# Patient Record
Sex: MALE | Race: WHITE | NOT HISPANIC OR LATINO | Employment: OTHER | URBAN - METROPOLITAN AREA
[De-identification: names, ages, dates, MRNs, and addresses within clinical notes are randomized per-mention and may not be internally consistent; named-entity substitution may affect disease eponyms.]

---

## 2017-02-01 ENCOUNTER — TRANSCRIBE ORDERS (OUTPATIENT)
Dept: LAB | Facility: HOSPITAL | Age: 62
End: 2017-02-01

## 2017-02-01 DIAGNOSIS — E11.8 DIABETIC COMPLICATION (HCC): Primary | ICD-10-CM

## 2017-02-06 ENCOUNTER — OFFICE VISIT (OUTPATIENT)
Dept: FAMILY MEDICINE CLINIC | Facility: CLINIC | Age: 62
End: 2017-02-06

## 2017-02-06 VITALS
HEART RATE: 70 BPM | HEIGHT: 71 IN | DIASTOLIC BLOOD PRESSURE: 70 MMHG | SYSTOLIC BLOOD PRESSURE: 108 MMHG | BODY MASS INDEX: 23.24 KG/M2 | WEIGHT: 166 LBS | OXYGEN SATURATION: 96 %

## 2017-02-06 DIAGNOSIS — R25.1 TREMOR: ICD-10-CM

## 2017-02-06 DIAGNOSIS — I10 ESSENTIAL HYPERTENSION: Primary | ICD-10-CM

## 2017-02-06 DIAGNOSIS — E78.5 DYSLIPIDEMIA: ICD-10-CM

## 2017-02-06 PROCEDURE — 99213 OFFICE O/P EST LOW 20 MIN: CPT | Performed by: NURSE PRACTITIONER

## 2017-02-06 RX ORDER — ASPIRIN 81 MG/1
81 TABLET ORAL DAILY
COMMUNITY

## 2017-02-06 RX ORDER — ATORVASTATIN CALCIUM 40 MG/1
40 TABLET, FILM COATED ORAL DAILY
Qty: 30 TABLET | Refills: 2 | Status: SHIPPED | OUTPATIENT
Start: 2017-02-06

## 2017-02-06 RX ORDER — PRIMIDONE 50 MG/1
50 TABLET ORAL 2 TIMES DAILY
Qty: 60 TABLET | Refills: 2 | Status: SHIPPED | OUTPATIENT
Start: 2017-02-06 | End: 2017-05-26 | Stop reason: SDUPTHER

## 2017-02-06 RX ORDER — PROPRANOLOL HYDROCHLORIDE 40 MG/1
40 TABLET ORAL 2 TIMES DAILY
Qty: 60 TABLET | Refills: 2 | Status: SHIPPED | OUTPATIENT
Start: 2017-02-06 | End: 2017-05-26 | Stop reason: SDUPTHER

## 2017-02-06 RX ORDER — RAMIPRIL 5 MG/1
5 CAPSULE ORAL DAILY
Qty: 30 CAPSULE | Refills: 2 | Status: SHIPPED | OUTPATIENT
Start: 2017-02-06 | End: 2017-04-25 | Stop reason: SDUPTHER

## 2017-02-06 NOTE — PROGRESS NOTES
"Subjective   Tanvir Sanchez is a 61 y.o. male.     History of Present Illness     6 month f/u  Mr Sanchez is here for 6 mo f/u.  He is taking altace and propranolol for HTN, home BP readings are \"good\".  He is also needing refill on primidone for his tremor.  This works very well for him  He has seen cardiology for annual f/u 8/2016, he denies any anginal symptoms  He is also currently under the care of Dr. Rodriguez for his diabetes.  He has no complaints today    The following portions of the patient's history were reviewed and updated as appropriate: allergies, current medications, past family history, past medical history, past social history, past surgical history and problem list.    Review of Systems   Constitutional: Negative.    HENT: Negative.    Respiratory: Negative for shortness of breath and stridor. Cough: chronic cough, +smoker, did not have xray done at last visit.    Cardiovascular: Negative.    Gastrointestinal: Negative.    Endocrine: Negative.    Neurological: Negative.        Objective   Physical Exam   Constitutional: He is oriented to person, place, and time. He appears well-developed and well-nourished. No distress.   HENT:   Head: Normocephalic and atraumatic.   Eyes: Conjunctivae are normal.   Neck: Neck supple.   Cardiovascular: Normal rate, regular rhythm and normal heart sounds.    Pulmonary/Chest: Effort normal and breath sounds normal. No respiratory distress. He has no wheezes. He has no rales.   Neurological: He is alert and oriented to person, place, and time.   Skin: Skin is warm.   Vitals reviewed.      Assessment/Plan   Tanvir was seen today for follow-up.    Diagnoses and all orders for this visit:    Essential hypertension  -     ramipril (ALTACE) 5 MG capsule; Take 1 capsule by mouth Daily.    Dyslipidemia  -     atorvastatin (LIPITOR) 40 MG tablet; Take 1 tablet by mouth Daily.    Tremor  -     propranolol (INDERAL) 40 MG tablet; Take 1 tablet by mouth 2 (Two) Times a Day.  -    "  primidone (MYSOLINE) 50 MG tablet; Take 1 tablet by mouth 2 (Two) Times a Day.    labs done 2/1/17 by Michael, we have called for results.  I have given him refills today.  Unfortunately he now has an insurance we do not take, he will transfer care and has book of PCPs.  Continue follow up with Michael as scheduled

## 2017-04-25 DIAGNOSIS — I10 ESSENTIAL HYPERTENSION: ICD-10-CM

## 2017-04-26 RX ORDER — RAMIPRIL 5 MG/1
CAPSULE ORAL
Qty: 30 CAPSULE | Refills: 3 | Status: SHIPPED | OUTPATIENT
Start: 2017-04-26

## 2017-05-26 DIAGNOSIS — R25.1 TREMOR: ICD-10-CM

## 2017-05-30 RX ORDER — PRIMIDONE 50 MG/1
TABLET ORAL
Qty: 60 TABLET | Refills: 1 | Status: SHIPPED | OUTPATIENT
Start: 2017-05-30

## 2017-05-30 RX ORDER — PROPRANOLOL HYDROCHLORIDE 40 MG/1
TABLET ORAL
Qty: 60 TABLET | Refills: 1 | Status: SHIPPED | OUTPATIENT
Start: 2017-05-30

## 2017-07-27 DIAGNOSIS — R25.1 TREMOR: ICD-10-CM

## 2017-07-28 RX ORDER — PRIMIDONE 50 MG/1
TABLET ORAL
Qty: 60 TABLET | Refills: 0 | OUTPATIENT
Start: 2017-07-28

## 2017-07-31 DIAGNOSIS — R25.1 TREMOR: ICD-10-CM

## 2017-07-31 RX ORDER — PRIMIDONE 50 MG/1
TABLET ORAL
Qty: 60 TABLET | Refills: 0 | OUTPATIENT
Start: 2017-07-31

## 2017-08-03 ENCOUNTER — OFFICE VISIT (OUTPATIENT)
Dept: RETAIL CLINIC | Facility: CLINIC | Age: 62
End: 2017-08-03

## 2017-08-03 DIAGNOSIS — Z02.83 ENCOUNTER FOR DRUG SCREENING: Primary | ICD-10-CM

## 2017-08-12 DIAGNOSIS — R25.1 TREMOR: ICD-10-CM

## 2017-08-23 RX ORDER — PRIMIDONE 50 MG/1
TABLET ORAL
Qty: 60 TABLET | Refills: 0 | OUTPATIENT
Start: 2017-08-23

## 2017-08-23 NOTE — TELEPHONE ENCOUNTER
Fadumo is no longer here and we do not take his ins so he will need to get this med from another provider.

## 2017-09-06 DIAGNOSIS — I10 ESSENTIAL HYPERTENSION: ICD-10-CM

## 2017-09-06 DIAGNOSIS — E78.5 DYSLIPIDEMIA: ICD-10-CM

## 2017-09-06 DIAGNOSIS — R25.1 TREMOR: ICD-10-CM

## 2017-09-06 RX ORDER — ATORVASTATIN CALCIUM 40 MG/1
TABLET, FILM COATED ORAL
Qty: 90 TABLET | Refills: 1 | OUTPATIENT
Start: 2017-09-06

## 2017-09-06 RX ORDER — PRIMIDONE 50 MG/1
TABLET ORAL
Qty: 60 TABLET | Refills: 0 | OUTPATIENT
Start: 2017-09-06

## 2017-09-06 RX ORDER — RAMIPRIL 5 MG/1
CAPSULE ORAL
Qty: 30 CAPSULE | Refills: 2 | OUTPATIENT
Start: 2017-09-06

## 2018-06-04 DIAGNOSIS — M25.512 LEFT SHOULDER PAIN, UNSPECIFIED CHRONICITY: Primary | ICD-10-CM

## 2018-06-05 ENCOUNTER — OFFICE VISIT (OUTPATIENT)
Dept: ORTHOPEDIC SURGERY | Facility: CLINIC | Age: 63
End: 2018-06-05

## 2018-06-05 VITALS — BODY MASS INDEX: 23.24 KG/M2 | HEIGHT: 71 IN | WEIGHT: 166 LBS | RESPIRATION RATE: 18 BRPM

## 2018-06-05 DIAGNOSIS — IMO0002 BURSITIS/TENDONITIS, SHOULDER: ICD-10-CM

## 2018-06-05 DIAGNOSIS — M25.512 LEFT SHOULDER PAIN, UNSPECIFIED CHRONICITY: Primary | ICD-10-CM

## 2018-06-05 PROCEDURE — 99204 OFFICE O/P NEW MOD 45 MIN: CPT | Performed by: ORTHOPAEDIC SURGERY

## 2018-06-05 NOTE — PROGRESS NOTES
Subjective   Patient ID: Tanvir Sanchez is a 62 y.o. male  Pain of the Left Upper Arm (Patient states about 3 months ago he was doing a work out regimen with a bowflex machine and later that night after he was finished working out his arm started hurting in the tricep area and hasn't stopped. He states it radiates down his arm.)             History of Present Illness    3 months ago was using a Bowflex machine after doing the machine he felt some pain and stiffness in the left upper arm hurts right behind the mid humeral area no paresthesias it really doesn't radiate to his neck at times it feels like it does radiate to the back of his left shoulder.  He's noticed some progressive loss of motion in the shoulder but has no pain in the shoulder itself he says.  No history of prior shoulder injuries dislocations used to do a lot of weightlifting years ago is right-hand-dominant.    Review of Systems   Constitutional: Negative for fever.   HENT: Negative for voice change.    Eyes: Negative for visual disturbance.   Respiratory: Negative for shortness of breath.    Cardiovascular: Negative for chest pain.   Gastrointestinal: Negative for abdominal distention and abdominal pain.   Genitourinary: Negative for dysuria.   Musculoskeletal: Positive for arthralgias. Negative for gait problem and joint swelling.   Skin: Negative for rash.   Neurological: Negative for speech difficulty.   Hematological: Does not bruise/bleed easily.   Psychiatric/Behavioral: Negative for confusion.       Past Medical History:   Diagnosis Date   • CAD (coronary artery disease)    • Diabetes mellitus     Type 2 diabetes, poorly controlled.   • Essential hypertension    • Essential tremor    • Hyperlipemia    • Myocardial infarction     Status post myocardial infarction with stenting x2 in [place] in 2009, incomplete database; Dr. Vi Justin.    • Tobacco use         Past Surgical History:   Procedure Laterality Date   • CORONARY ANGIOPLASTY WITH  STENT PLACEMENT      Status post myocardial infarction with stenting x2 in [place] in 2009, incomplete database; Dr. Vi Justin.       Family History   Problem Relation Age of Onset   • Coronary artery disease Father    • Diabetes Father        Social History     Social History   • Marital status:      Spouse name: N/A   • Number of children: N/A   • Years of education: N/A     Occupational History   •       Social History Main Topics   • Smoking status: Current Every Day Smoker     Packs/day: 0.50     Types: Cigarettes   • Smokeless tobacco: Never Used   • Alcohol use No   • Drug use: No   • Sexual activity: Defer     Other Topics Concern   • Not on file     Social History Narrative   • No narrative on file       I have reviewed all of the above social hx, family hx, surgical hx, medications, allergies & ROS and confirm that it is accurate.    Allergies   Allergen Reactions   • Nicoderm Cq [Nicotine]      heartburn         Current Outpatient Prescriptions:   •  Insulin Pen Needle (B-D ULTRAFINE III SHORT PEN) 31G X 8 MM misc, , Disp: , Rfl:   •  aspirin 81 MG EC tablet, Take 81 mg by mouth Daily., Disp: , Rfl:   •  atorvastatin (LIPITOR) 40 MG tablet, Take 1 tablet by mouth Daily., Disp: 30 tablet, Rfl: 2  •  Insulin Glargine (TOUJEO SOLOSTAR) 300 UNIT/ML solution pen-injector, Inject 40 Units under the skin daily., Disp: , Rfl:   •  metFORMIN (GLUCOPHAGE) 1000 MG tablet, Take 1,000 mg by mouth 2 (two) times a day with meals., Disp: , Rfl:   •  nitroglycerin (NITROSTAT) 0.4 MG SL tablet, Place 0.4 mg under the tongue every 5 (five) minutes as needed for chest pain. Take no more than 3 doses in 15 minutes., Disp: , Rfl:   •  primidone (MYSOLINE) 50 MG tablet, TAKE 1 TABLET BY MOUTH TWO TIMES A DAY , Disp: 60 tablet, Rfl: 1  •  propranolol (INDERAL) 40 MG tablet, TAKE 1 TABLET BY MOUTH TWO TIMES A DAY , Disp: 60 tablet, Rfl: 1  •  ramipril (ALTACE) 5 MG capsule, TAKE 1 CAPSULE BY MOUTH ONE TIME A DAY  ", Disp: 30 capsule, Rfl: 3    Objective   Resp 18   Ht 180.3 cm (71\")   Wt 75.3 kg (166 lb)   BMI 23.15 kg/m²    Physical Exam  Constitutional: Patient is oriented to person, place, and time. Patient appears well-developed and well-nourished.   HENT:Head: Normocephalic and atraumatic.   Eyes: EOM are normal. Pupils are equal, round, and reactive to light.   Neck: Normal range of motion. Neck supple.   Cardiovascular: Normal rate.    Pulmonary/Chest: Effort normal and breath sounds normal.   Abdominal: Soft.   Neurological: Patient is alert and oriented to person, place, and time.   Skin: Skin is warm and dry.   Psychiatric: Patient has a normal mood and affect.   Nursing note and vitals reviewed.       Ortho Exam   Left shoulder with some tenderness posterior lateral acromial area, no significant supraspinous atrophy, abduction limited to 120 forward flexion limited to 1:30 impingement sign negative for anterior shoulder pain no tenderness, clavicle joint or proximal biceps tendon, some tenderness at the mid substance trapezial area but no palpable masses erythema swelling or focal fluctuance.    Assessment/Plan   Review of Radiographic Studies:    Radiographic images today of affected area I personally viewed and showed no sign of acute fracture or dislocation.      Procedures     Tanvir was seen today for pain.    Diagnoses and all orders for this visit:    Left shoulder pain, unspecified chronicity  -     XR Humerus Left    Bursitis/tendonitis, shoulder       Orthopedic activities reviewed and patient expressed appreciation and Risk, benefits, and merits of treatment alternatives reviewed with the patient and questions answered      Recommendations/Plan:   Exercise, medications, injections, other patient advice, and return appointment as noted.    Patient agreeable to call or return sooner for any concerns.             Impression:  Mild case of adhesive capsulitis left shoulder with referred pain to the left " proximal upper arm possible rotator cuff impingement, unable to take anti-inflammatory medications upon the recommendation of his cardiologist with a history of MI and  2 prior stents  Plan:  Therapy referral recheck 6 weeks if pain not improving we'll order MRI at that point

## 2020-07-06 ENCOUNTER — CONSULT (OUTPATIENT)
Dept: CARDIOLOGY | Facility: CLINIC | Age: 65
End: 2020-07-06

## 2020-07-06 VITALS
DIASTOLIC BLOOD PRESSURE: 60 MMHG | OXYGEN SATURATION: 99 % | WEIGHT: 158 LBS | SYSTOLIC BLOOD PRESSURE: 110 MMHG | HEIGHT: 71 IN | TEMPERATURE: 98.4 F | HEART RATE: 50 BPM | BODY MASS INDEX: 22.12 KG/M2

## 2020-07-06 DIAGNOSIS — E78.5 DYSLIPIDEMIA: ICD-10-CM

## 2020-07-06 DIAGNOSIS — I25.10 CORONARY ARTERY DISEASE INVOLVING NATIVE CORONARY ARTERY OF NATIVE HEART WITHOUT ANGINA PECTORIS: Primary | ICD-10-CM

## 2020-07-06 DIAGNOSIS — I10 ESSENTIAL HYPERTENSION: ICD-10-CM

## 2020-07-06 DIAGNOSIS — I25.119 CORONARY ARTERY DISEASE INVOLVING NATIVE CORONARY ARTERY OF NATIVE HEART WITH ANGINA PECTORIS (HCC): Primary | ICD-10-CM

## 2020-07-06 DIAGNOSIS — R06.09 DOE (DYSPNEA ON EXERTION): ICD-10-CM

## 2020-07-06 PROCEDURE — 99243 OFF/OP CNSLTJ NEW/EST LOW 30: CPT | Performed by: INTERNAL MEDICINE

## 2020-07-06 PROCEDURE — 93000 ELECTROCARDIOGRAM COMPLETE: CPT | Performed by: INTERNAL MEDICINE

## 2020-07-06 RX ORDER — LEVOTHYROXINE SODIUM 0.05 MG/1
50 TABLET ORAL DAILY
COMMUNITY

## 2020-07-06 NOTE — PROGRESS NOTES
Subjective:     Encounter Date:07/06/2020    Primary Care Physician: Yong Lam MD      Patient ID: Tanvir Sanchez is a 65 y.o. male.    Chief Complaint:Coronary artery disease involving native coronary artery of     PROBLEM LIST:  1. Coronary artery  disease:  a. Status post myocardial infarction with stenting x2  in [place] in 2009, incomplete database; Dr. Vi Justin.   2. Hypertension.   3.  Dyslipidemia.   4. Type 2 diabetes, poorly controlled.   5. Ongoing tobacco abuse.   6. Hypothyroidism   7. Essential tremor.         Allergies   Allergen Reactions   • Nicoderm Cq [Nicotine]      heartburn         Current Outpatient Medications:   •  aspirin 81 MG EC tablet, Take 81 mg by mouth Daily., Disp: , Rfl:   •  atorvastatin (LIPITOR) 40 MG tablet, Take 1 tablet by mouth Daily., Disp: 30 tablet, Rfl: 2  •  Insulin Glargine (TOUJEO SOLOSTAR) 300 UNIT/ML solution pen-injector, Inject 55 Units under the skin into the appropriate area as directed Daily., Disp: , Rfl:   •  Insulin Pen Needle (B-D ULTRAFINE III SHORT PEN) 31G X 8 MM misc, , Disp: , Rfl:   •  levothyroxine (SYNTHROID, LEVOTHROID) 50 MCG tablet, Take 50 mcg by mouth Daily., Disp: , Rfl:   •  metFORMIN (GLUCOPHAGE) 1000 MG tablet, Take 500 mg by mouth 2 (Two) Times a Day With Meals., Disp: , Rfl:   •  nitroglycerin (NITROSTAT) 0.4 MG SL tablet, Place 0.4 mg under the tongue every 5 (five) minutes as needed for chest pain. Take no more than 3 doses in 15 minutes., Disp: , Rfl:   •  primidone (MYSOLINE) 50 MG tablet, TAKE 1 TABLET BY MOUTH TWO TIMES A DAY  (Patient taking differently: Take 50 mg by mouth 3 (Three) Times a Day.), Disp: 60 tablet, Rfl: 1  •  propranolol (INDERAL) 40 MG tablet, TAKE 1 TABLET BY MOUTH TWO TIMES A DAY , Disp: 60 tablet, Rfl: 1  •  ramipril (ALTACE) 5 MG capsule, TAKE 1 CAPSULE BY MOUTH ONE TIME A DAY , Disp: 30 capsule, Rfl: 3        History of Present Illness    Patient is a 65-year-old  male who we are seeing  today for reestablishment of cardiac care for previous history of coronary artery disease.  It is been last seen about 4 years ago.  Patient notes over that the last few months especially since the summer began feeling exhausted after working for roughly 4 hours.  Some occasional chest discomfort.  Denies any significant increase of shortness of breath.  No syncope, near syncope, or orthopnea.  Has some occasional lower extremity edema which is typically improved with elevation of his extremities.  As it is been sometime since he had been evaluated for coronary disease he referred himself back for further evaluation especially given his now new exertional symptoms.    The following portions of the patient's history were reviewed and updated as appropriate: allergies, current medications, past family history, past medical history, past social history, past surgical history and problem list.    Family History   Problem Relation Age of Onset   • Coronary artery disease Father    • Diabetes Father        Social History     Tobacco Use   • Smoking status: Current Every Day Smoker     Packs/day: 0.50     Types: Cigarettes   • Smokeless tobacco: Never Used   Substance Use Topics   • Alcohol use: No   • Drug use: No         Review of Systems   Constitution: Positive for malaise/fatigue.   HENT: Positive for tinnitus.    Cardiovascular: Negative for chest pain, dyspnea on exertion, leg swelling, palpitations and syncope.   Respiratory: Negative.  Negative for shortness of breath.    Endocrine: Positive for cold intolerance.   Hematologic/Lymphatic: Negative for bleeding problem. Bruises/bleeds easily.   Skin: Negative for rash.   Musculoskeletal: Negative for muscle weakness and myalgias.   Gastrointestinal: Negative for heartburn, nausea and vomiting.   Neurological: Positive for excessive daytime sleepiness and tremors. Negative for dizziness, light-headedness, loss of balance and numbness.   Allergic/Immunologic: Positive  "for environmental allergies.          Objective:   /60 (BP Location: Right arm, Patient Position: Sitting)   Pulse 50   Temp 98.4 °F (36.9 °C)   Ht 180.3 cm (71\")   Wt 71.7 kg (158 lb)   SpO2 99%   BMI 22.04 kg/m²         Physical Exam   Constitutional: He is oriented to person, place, and time. He appears well-developed and well-nourished.   HENT:   Head: Normocephalic and atraumatic.   Mouth/Throat: Oropharynx is clear and moist.   Eyes: Pupils are equal, round, and reactive to light. Conjunctivae are normal.   Neck: Normal carotid pulses and no JVD present. Carotid bruit is not present. No thyromegaly present.   Cardiovascular: Normal rate, regular rhythm, S1 normal and S2 normal. Exam reveals gallop. Exam reveals no friction rub.   No murmur heard.  Pulses:       Carotid pulses are 2+ on the right side, and 2+ on the left side.       Dorsalis pedis pulses are 2+ on the right side, and 2+ on the left side.        Posterior tibial pulses are 2+ on the right side, and 2+ on the left side.   Pulmonary/Chest: No respiratory distress. He has no wheezes. He has no rales. He exhibits no tenderness.   Abdominal: He exhibits no distension, no abdominal bruit and no mass. There is no hepatosplenomegaly. There is no tenderness. There is no rebound.   Musculoskeletal: He exhibits no edema, tenderness or deformity.   Lymphadenopathy:     He has no cervical adenopathy.   Neurological: He is alert and oriented to person, place, and time. He has normal strength.   Skin: Skin is warm and dry. No rash noted. No cyanosis. Nails show no clubbing.   Psychiatric: He has a normal mood and affect. Cognition and memory are normal.         ECG 12 Lead  Date/Time: 7/6/2020 3:41 PM  Performed by: Jose Alberto Cates MD  Authorized by: Jose Alberto Cates MD   Comparison: not compared with previous ECG   Previous ECG: no previous ECG available  Rhythm: sinus bradycardia    Clinical impression: normal ECG                  Assessment: "   Assessment/Plan      Tanvir was seen today for coronary artery disease involving native coronary artery of .    Diagnoses and all orders for this visit:    Coronary artery disease involving native coronary artery of native heart with angina pectoris (CMS/[facility])  -     ECG 12 Lead    FLETCHER (dyspnea on exertion)    Dyslipidemia    Essential hypertension      1.  Known coronary artery disease.  New onset functional class II-3 dyspnea because possible anginal equivalent.  2.  Dyslipidemia well-controlled  3.  Hypertension well-controlled    Recommendations  1.  Exercise myocardial perfusion study and echocardiogram to evaluate symptoms.  2.  Continue current medical therapy  3.  Further recommendations after the above    I have seen and examined the patient, I have reviewed the note, discussed the case with the advance practice clinician, made necessary changes and I agree with the final note.    Jose Alberto Cates MD  07/06/20  16:19          Rachel ACUNA scribed portions of this dictation for Dr. Jose Alberto Cates.    Dictated utilizing Dragon dictation

## 2020-09-01 ENCOUNTER — HOSPITAL ENCOUNTER (OUTPATIENT)
Dept: CARDIOLOGY | Facility: HOSPITAL | Age: 65
Discharge: HOME | End: 2020-09-01
Admitting: INTERNAL MEDICINE

## 2020-09-01 VITALS — WEIGHT: 158 LBS | HEIGHT: 71 IN | BODY MASS INDEX: 22.12 KG/M2

## 2020-09-01 DIAGNOSIS — I25.10 CORONARY ARTERY DISEASE INVOLVING NATIVE CORONARY ARTERY OF NATIVE HEART WITHOUT ANGINA PECTORIS: ICD-10-CM

## 2020-09-01 PROCEDURE — 93017 CV STRESS TEST TRACING ONLY: CPT

## 2020-09-01 PROCEDURE — 93018 CV STRESS TEST I&R ONLY: CPT | Performed by: INTERNAL MEDICINE

## 2020-09-01 PROCEDURE — 0 TECHNETIUM SESTAMIBI: Performed by: INTERNAL MEDICINE

## 2020-09-01 PROCEDURE — 78452 HT MUSCLE IMAGE SPECT MULT: CPT

## 2020-09-01 PROCEDURE — 93306 TTE W/DOPPLER COMPLETE: CPT

## 2020-09-01 PROCEDURE — 93306 TTE W/DOPPLER COMPLETE: CPT | Performed by: INTERNAL MEDICINE

## 2020-09-01 PROCEDURE — 78452 HT MUSCLE IMAGE SPECT MULT: CPT | Performed by: INTERNAL MEDICINE

## 2020-09-01 PROCEDURE — A9500 TC99M SESTAMIBI: HCPCS | Performed by: INTERNAL MEDICINE

## 2020-09-01 RX ADMIN — TECHNETIUM TC 99M SESTAMIBI 1 DOSE: 1 INJECTION INTRAVENOUS at 11:02

## 2020-09-01 RX ADMIN — TECHNETIUM TC 99M SESTAMIBI 1 DOSE: 1 INJECTION INTRAVENOUS at 13:35

## 2020-09-03 LAB
BH CV ECHO MEAS - AO ROOT AREA (BSA CORRECTED): 2.1
BH CV ECHO MEAS - AO ROOT AREA: 13 CM^2
BH CV ECHO MEAS - AO ROOT DIAM: 4.1 CM
BH CV ECHO MEAS - BSA(HAYCOCK): 1.9 M^2
BH CV ECHO MEAS - BSA: 1.9 M^2
BH CV ECHO MEAS - BZI_BMI: 22 KILOGRAMS/M^2
BH CV ECHO MEAS - BZI_METRIC_HEIGHT: 180.3 CM
BH CV ECHO MEAS - BZI_METRIC_WEIGHT: 71.7 KG
BH CV ECHO MEAS - EDV(CUBED): 73 ML
BH CV ECHO MEAS - EDV(MOD-SP2): 107 ML
BH CV ECHO MEAS - EDV(MOD-SP4): 111 ML
BH CV ECHO MEAS - EDV(TEICH): 77.6 ML
BH CV ECHO MEAS - EF(CUBED): 74.3 %
BH CV ECHO MEAS - EF(MOD-BP): 67 %
BH CV ECHO MEAS - EF(MOD-SP2): 61.7 %
BH CV ECHO MEAS - EF(MOD-SP4): 72.1 %
BH CV ECHO MEAS - EF(TEICH): 66.5 %
BH CV ECHO MEAS - ESV(CUBED): 18.8 ML
BH CV ECHO MEAS - ESV(MOD-SP2): 41 ML
BH CV ECHO MEAS - ESV(MOD-SP4): 31 ML
BH CV ECHO MEAS - ESV(TEICH): 26 ML
BH CV ECHO MEAS - FS: 36.4 %
BH CV ECHO MEAS - IVS/LVPW: 0.85
BH CV ECHO MEAS - IVSD: 0.89 CM
BH CV ECHO MEAS - LAD MAJOR: 4.6 CM
BH CV ECHO MEAS - LAT PEAK E' VEL: 12.6 CM/SEC
BH CV ECHO MEAS - LATERAL E/E' RATIO: 9.1
BH CV ECHO MEAS - LV DIASTOLIC VOL/BSA (35-75): 58.2 ML/M^2
BH CV ECHO MEAS - LV IVRT: 0.08 SEC
BH CV ECHO MEAS - LV MASS(C)D: 131.2 GRAMS
BH CV ECHO MEAS - LV MASS(C)DI: 68.8 GRAMS/M^2
BH CV ECHO MEAS - LV SYSTOLIC VOL/BSA (12-30): 16.2 ML/M^2
BH CV ECHO MEAS - LVIDD: 4.2 CM
BH CV ECHO MEAS - LVIDS: 2.7 CM
BH CV ECHO MEAS - LVLD AP2: 8.9 CM
BH CV ECHO MEAS - LVLD AP4: 8.5 CM
BH CV ECHO MEAS - LVLS AP2: 7 CM
BH CV ECHO MEAS - LVLS AP4: 7 CM
BH CV ECHO MEAS - LVOT AREA (M): 3.8 CM^2
BH CV ECHO MEAS - LVOT AREA: 3.9 CM^2
BH CV ECHO MEAS - LVOT DIAM: 2.2 CM
BH CV ECHO MEAS - LVPWD: 1.1 CM
BH CV ECHO MEAS - MED PEAK E' VEL: 10.1 CM/SEC
BH CV ECHO MEAS - MEDIAL E/E' RATIO: 11.4
BH CV ECHO MEAS - MV A MAX VEL: 93.8 CM/SEC
BH CV ECHO MEAS - MV DEC TIME: 0.21 SEC
BH CV ECHO MEAS - MV E MAX VEL: 116.8 CM/SEC
BH CV ECHO MEAS - MV E/A: 1.2
BH CV ECHO MEAS - PULM A REVS VEL: 33.1 CM/SEC
BH CV ECHO MEAS - PULM DIAS VEL: 46.9 CM/SEC
BH CV ECHO MEAS - PULM S/D: 1.2
BH CV ECHO MEAS - PULM SYS VEL: 55.3 CM/SEC
BH CV ECHO MEAS - RAP SYSTOLE: 8 MMHG
BH CV ECHO MEAS - SI(CUBED): 28.4 ML/M^2
BH CV ECHO MEAS - SI(MOD-SP2): 34.6 ML/M^2
BH CV ECHO MEAS - SI(MOD-SP4): 41.9 ML/M^2
BH CV ECHO MEAS - SI(TEICH): 27.1 ML/M^2
BH CV ECHO MEAS - SV(CUBED): 54.2 ML
BH CV ECHO MEAS - SV(MOD-SP2): 66 ML
BH CV ECHO MEAS - SV(MOD-SP4): 80 ML
BH CV ECHO MEAS - SV(TEICH): 51.7 ML
BH CV ECHO MEAS - TAPSE (>1.6): 3.1 CM2
BH CV ECHO MEASUREMENTS AVERAGE E/E' RATIO: 10.29
BH CV STRESS BP STAGE 1: NORMAL
BH CV STRESS BP STAGE 2: NORMAL
BH CV STRESS DURATION MIN STAGE 1: 3
BH CV STRESS DURATION MIN STAGE 2: 3
BH CV STRESS DURATION MIN STAGE 3: 2
BH CV STRESS DURATION SEC STAGE 1: 0
BH CV STRESS DURATION SEC STAGE 2: 0
BH CV STRESS DURATION SEC STAGE 3: 12
BH CV STRESS GRADE STAGE 1: 10
BH CV STRESS GRADE STAGE 2: 12
BH CV STRESS GRADE STAGE 3: 14
BH CV STRESS HR STAGE 1: 86
BH CV STRESS HR STAGE 2: 112
BH CV STRESS HR STAGE 3: 133
BH CV STRESS METS STAGE 1: 5
BH CV STRESS METS STAGE 2: 7.5
BH CV STRESS METS STAGE 3: 10
BH CV STRESS PROTOCOL 1: NORMAL
BH CV STRESS RECOVERY BP: NORMAL MMHG
BH CV STRESS RECOVERY HR: 85 BPM
BH CV STRESS RECOVERY O2: 97 %
BH CV STRESS SPEED STAGE 1: 1.7
BH CV STRESS SPEED STAGE 2: 2.5
BH CV STRESS SPEED STAGE 3: 3.4
BH CV STRESS STAGE 1: 1
BH CV STRESS STAGE 2: 2
BH CV STRESS STAGE 3: 3
BH CV VAS BP LEFT ARM: NORMAL MMHG
BH CV XLRA - RV BASE: 4.6 CM
BH CV XLRA - RV LENGTH: 8.2 CM
BH CV XLRA - RV MID: 3.6 CM
BH CV XLRA - TDI S': 15.7 CM/SEC
LEFT ATRIUM VOLUME INDEX: 26.7 ML/M^2
LEFT ATRIUM VOLUME: 51 ML
LV EF 2D ECHO EST: 65 %
LV EF NUC BP: 68 %
MAXIMAL PREDICTED HEART RATE: 157 BPM
PERCENT MAX PREDICTED HR: 85.35 %
STRESS BASELINE BP: NORMAL MMHG
STRESS BASELINE HR: 56 BPM
STRESS O2 SAT REST: 99 %
STRESS PERCENT HR: 100 %
STRESS POST ESTIMATED WORKLOAD: 8.8 METS
STRESS POST EXERCISE DUR MIN: 8 MIN
STRESS POST EXERCISE DUR SEC: 12 SEC
STRESS POST PEAK BP: NORMAL MMHG
STRESS POST PEAK HR: 134 BPM
STRESS TARGET HR: 133 BPM

## 2020-09-09 ENCOUNTER — TELEPHONE (OUTPATIENT)
Dept: CARDIOLOGY | Facility: CLINIC | Age: 65
End: 2020-09-09

## 2020-09-09 NOTE — TELEPHONE ENCOUNTER
Left VM advising of below.  Normal echo----- Message from Jose Alberto Cates MD sent at 9/4/2020  5:23 PM EDT -----  Normal testing

## 2021-10-18 ENCOUNTER — OFFICE VISIT (OUTPATIENT)
Dept: CARDIOLOGY | Facility: CLINIC | Age: 66
End: 2021-10-18

## 2021-10-18 VITALS
BODY MASS INDEX: 22.4 KG/M2 | OXYGEN SATURATION: 95 % | SYSTOLIC BLOOD PRESSURE: 70 MMHG | DIASTOLIC BLOOD PRESSURE: 48 MMHG | WEIGHT: 160 LBS | HEART RATE: 51 BPM | HEIGHT: 71 IN

## 2021-10-18 DIAGNOSIS — I48.0 PAROXYSMAL ATRIAL FIBRILLATION (HCC): Primary | ICD-10-CM

## 2021-10-18 DIAGNOSIS — I25.10 CORONARY ARTERY DISEASE INVOLVING NATIVE CORONARY ARTERY OF NATIVE HEART WITHOUT ANGINA PECTORIS: ICD-10-CM

## 2021-10-18 DIAGNOSIS — I95.2 HYPOTENSION DUE TO DRUGS: ICD-10-CM

## 2021-10-18 PROCEDURE — 99214 OFFICE O/P EST MOD 30 MIN: CPT | Performed by: NURSE PRACTITIONER

## 2021-10-18 PROCEDURE — 93000 ELECTROCARDIOGRAM COMPLETE: CPT | Performed by: NURSE PRACTITIONER

## 2021-10-18 NOTE — PROGRESS NOTES
Subjective:     Encounter Date:10/18/2021    Primary Care Physician: Yong Lam MD      Patient ID: Tanvir Sanchez is a 66 y.o. male.    Chief Complaint:Coronary Artery Disease    PROBLEM LIST:  1. Coronary artery  disease:  a. Status post myocardial infarction with stenting x2  in [place] in 2009, incomplete database; Dr. Vi Justin.   b. 9/2020 MPS no ischemia   2. Paroxysmal atrial fibrillation  a. CHADSVASC- 1  b. Initial diagnosis 10/2021, spont CV to SR. Per patient symptomatic  3. Hypertension.   4.  Dyslipidemia.   5. Type 2 diabetes  6. Ongoing tobacco abuse.   7. Lung cancer  a. Diagnosed 9/2021   b. Awaiting oncology evaluation.   8. Hypothyroidism   9. Essential tremor.         Allergies   Allergen Reactions   • Nicoderm Cq [Nicotine] Other (See Comments)     heartburn         Current Outpatient Medications:   •  aspirin 81 MG EC tablet, Take 81 mg by mouth Daily., Disp: , Rfl:   •  atorvastatin (LIPITOR) 40 MG tablet, Take 1 tablet by mouth Daily., Disp: 30 tablet, Rfl: 2  •  Insulin Glargine (TOUJEO SOLOSTAR) 300 UNIT/ML solution pen-injector, Inject 55 Units under the skin into the appropriate area as directed Daily., Disp: , Rfl:   •  Insulin Pen Needle (B-D ULTRAFINE III SHORT PEN) 31G X 8 MM misc, , Disp: , Rfl:   •  levothyroxine (SYNTHROID, LEVOTHROID) 50 MCG tablet, Take 50 mcg by mouth Daily., Disp: , Rfl:   •  metFORMIN (GLUCOPHAGE) 1000 MG tablet, Take 500 mg by mouth 2 (Two) Times a Day With Meals., Disp: , Rfl:   •  nitroglycerin (NITROSTAT) 0.4 MG SL tablet, Place 0.4 mg under the tongue every 5 (five) minutes as needed for chest pain. Take no more than 3 doses in 15 minutes., Disp: , Rfl:   •  primidone (MYSOLINE) 50 MG tablet, TAKE 1 TABLET BY MOUTH TWO TIMES A DAY  (Patient taking differently: Take 50 mg by mouth 3 (Three) Times a Day.), Disp: 60 tablet, Rfl: 1  •  propranolol (INDERAL) 40 MG tablet, TAKE 1 TABLET BY MOUTH TWO TIMES A DAY , Disp: 60 tablet, Rfl: 1  •   ramipril (ALTACE) 5 MG capsule, TAKE 1 CAPSULE BY MOUTH ONE TIME A DAY , Disp: 30 capsule, Rfl: 3        History of Present Illness    Patient is a 66-year-old  male who is being seen today for follow-up for new diagnosis of paroxysmal atrial fibrillation.  Patient was to undergo cataract surgery and at that time was noted to be hypertensive with rapid heart rate.  By wife report this was noted to be atrial fibrillation he was sent to the emergency department.  There he was noted to have atrial fibrillation and eventually converted back to sinus rhythm.  Our office was contacted and instructed to increase propanolol therapy.  Patient is since done that.  No significant chest pain, pressure.  Does have shortness of breath.  Since last being seen his also been diagnosed with a lung mass.  He has an appointment with oncology tomorrow.  Anticoagulation has been deferred currently secondary to [id number] or of 1, symptomatic nature, and possible need of upcoming surgical procedures and malignant lung mass.  No reported syncope or near syncope.  Patient is noted to be hypotensive today but does not complain of dizziness.    The following portions of the patient's history were reviewed and updated as appropriate: allergies, current medications, past family history, past medical history, past social history, past surgical history and problem list.      Social History     Tobacco Use   • Smoking status: Current Every Day Smoker     Packs/day: 0.50     Types: Cigarettes   • Smokeless tobacco: Never Used   Vaping Use   • Vaping Use: Never used   Substance Use Topics   • Alcohol use: No   • Drug use: No         Review of Systems   Constitutional: Positive for malaise/fatigue.   Cardiovascular: Positive for dyspnea on exertion and palpitations. Negative for chest pain, leg swelling and syncope.   Respiratory: Positive for shortness of breath.    Hematologic/Lymphatic: Negative for bleeding problem. Does not  "bruise/bleed easily.   Skin: Negative for rash.   Musculoskeletal: Positive for arthritis. Negative for muscle weakness and myalgias.   Gastrointestinal: Negative for heartburn, nausea and vomiting.   Neurological: Positive for light-headedness. Negative for dizziness, loss of balance and numbness.          Objective:   BP (!) 70/48 (BP Location: Left arm, Patient Position: Sitting, Cuff Size: Adult)   Pulse 51   Ht 180.3 cm (71\")   Wt 72.6 kg (160 lb)   SpO2 95%   BMI 22.32 kg/m²         Vitals reviewed.   Constitutional:       Appearance: Well-developed and not in distress. Frail.   Neck:      Vascular: No JVD.      Trachea: No tracheal deviation.   Pulmonary:      Effort: Pulmonary effort is normal.      Comments: Decreased left base  Cardiovascular:      Bradycardia present. Regular rhythm.   Pulses:     Intact distal pulses.   Edema:     Peripheral edema absent.   Abdominal:      General: Bowel sounds are normal.      Tenderness: There is no abdominal tenderness.   Musculoskeletal:         General: No deformity. Skin:     General: Skin is warm and dry.   Neurological:      Mental Status: Alert and oriented to person, place, and time.           ECG 12 Lead    Date/Time: 10/18/2021 1:01 PM  Performed by: Rachel Pimentel APRN  Authorized by: Rachel Pimentel APRN   Comparison: compared with previous ECG from 7/6/2020  Comparison to previous ECG: Newly noted LVH.  Rhythm: sinus bradycardia  Other findings: left ventricular hypertrophy    Clinical impression: abnormal EKG                  Assessment:   Assessment/Plan      Diagnoses and all orders for this visit:    1. Paroxysmal atrial fibrillation (HCC) (Primary), new diagnosis.  [id number] score of 1.  On aspirin.  On propanolol.  EKG reviewed from ER presentation to [facility] on October 7.  Atrial fibrillation initially noted and then sinus bradycardia with PVCs.    2. Coronary artery disease involving native coronary artery of native heart " without angina pectoris.  Stable.  On statin.    3. Hypotension due to drugs.  Hypotensive today.  On beta-blocker and ACE inhibitor.    Other orders  -     ECG 12 Lead      Plan:  1. Discontinue ramipril at this time secondary to hypotension.  2. Continue beta-blocker for atrial fibrillation.  Continue aspirin as written.  3. Continue other current cardiac medications.  4. Discussed the case with Dr. Cates.  At this time we will defer anticoagulation given symptomatic nature and noted malignancy.  5. Discussed with patient and wife.  We will plan for echocardiogram in the near term given new onset of atrial fibrillation as well as new malignancy.  6. Discussed with patient and wife it is more pertinent at this time that he have his lung mass addressed.  7. Will attempt to obtain further records from recent ER presentation.  8. Follow-up in 2 months time or sooner if needed.       Rachel ACUNA     Dictated utilizing Dragon dictation

## 2021-10-19 ENCOUNTER — TELEPHONE (OUTPATIENT)
Dept: CARDIOLOGY | Facility: CLINIC | Age: 66
End: 2021-10-19

## 2021-10-19 NOTE — TELEPHONE ENCOUNTER
Requesting pre procedure risk assessment for a 2nd bronchoscopy with EBUS, verified with patient, is having a 2nd procedure.

## 2021-10-28 ENCOUNTER — TELEPHONE (OUTPATIENT)
Dept: CARDIOLOGY | Facility: CLINIC | Age: 66
End: 2021-10-28

## 2021-10-28 NOTE — TELEPHONE ENCOUNTER
Son called to report he is in ICU in [place] with pneumonia, intubated, went in for dyspnea, has pneumonia behind the lung cancer.

## 2021-11-12 ENCOUNTER — APPOINTMENT (OUTPATIENT)
Dept: CARDIOLOGY | Facility: HOSPITAL | Age: 66
End: 2021-11-12